# Patient Record
Sex: FEMALE | Race: WHITE
[De-identification: names, ages, dates, MRNs, and addresses within clinical notes are randomized per-mention and may not be internally consistent; named-entity substitution may affect disease eponyms.]

---

## 2021-11-05 NOTE — REPMRS
Patient History

The patient states she has not had a clinical breast exam in over

a year.

Patient had first child at age 38.

Family history of endometrial cancer under age 50 in paternal 

grandmother.

Took hormonal contraceptives for 16 years.

Pfizer vaccines 2/4/21 left arm. 2/25/21 left arm. Booster: 

10/7/21 left arm.

Pt denied pregnancy.

Patient states no breast complaints today. Patient has signed MRS

History Sheet.

 

Digital Woman Screen Mammo: November 5, 2021 - Exam #: 

[id number]

Bilateral CC and MLO view(s) were taken.

 

Technologist: Nurys Dias, RT

FINDINGS: The breast tissue is heterogeneously dense.  This may 

lower the sensitivity of mammography.  Screening.

 

Digital screening (2D) mammography was performed bilaterally. 

Additionally, breast tomosynthesis (3D) mammography was perfomed 

bilaterally in the CC and MLO projections.

 

Today's examination is the initial screening examination.

 

By history, the patient has no complaints of a palpable breast 

abnormality or other significant breast complaints.

 

The breasts are symmetric in size and shape.Dense heterogenous 

fibroglandular elements are seen bilaterally to such a degree 

that the sensitivity of the mammogram in detecting cancer is 

decreased. . There are no masses. There is no internal 

architectural distortion. There are no suspicious microcalcific 

clusters. Skin thickening or nipple retraction is not present.

 

IMPRESSION:

 

BI-RADS Category 2- Benign Findings. There is no evidence of 

malignant alteration of the breasts. Followup examination 

recommended in one year.

The Volpara volumetric breast density category is C, the breasts 

are heterogenously dense which may obscure small masses.

[facility] mamm[facility] 

AMP,an FDA approved computer aided detection system for 

mammography.

The lifetime Tyrer-Cuzick score is   15.2   %  Due to the density

of the breasts or Tyrer Cuzick score of 20% or greater, 

MRI/whole breast screening  ultrasound is warranted.

Negative x-ray reports should not delay surgical consultation if 

a dominant or clinically suspicious mass is present.

 

Not all breast cancers can be identified by mammography. 

Therefore, we recommend that you continue to perform regular 

breast self-examination and physical examination and then 

promptly contact your physician of any concerns or changes.

 

Adenosis and dense breasts may obscure an underlying neoplasm.

 

Assessment: BI-RADS/ACR category 2 mammogram. Benign Findings.

 

Recommendation

Routine screening mammogram of both breasts in 1 year.

 

Electronically Signed By: Stevenson Guo DO 11/05/21 0905

## 2022-05-16 ENCOUNTER — HOSPITAL ENCOUNTER (OUTPATIENT)
Dept: HOSPITAL 53 - M PLAIMG | Age: 42
End: 2022-05-16
Attending: PHYSICIAN ASSISTANT
Payer: COMMERCIAL

## 2022-05-16 DIAGNOSIS — G95.89: ICD-10-CM

## 2022-05-16 DIAGNOSIS — M51.27: Primary | ICD-10-CM

## 2022-05-16 DIAGNOSIS — M54.42: ICD-10-CM

## 2022-06-15 ENCOUNTER — HOSPITAL ENCOUNTER (OUTPATIENT)
Dept: HOSPITAL 53 - M RAD | Age: 42
End: 2022-06-15
Attending: PHYSICIAN ASSISTANT
Payer: COMMERCIAL

## 2022-06-15 DIAGNOSIS — R93.7: Primary | ICD-10-CM

## 2022-06-15 PROCEDURE — 72149 MRI LUMBAR SPINE W/DYE: CPT

## 2022-06-16 ENCOUNTER — HOSPITAL ENCOUNTER (OUTPATIENT)
Dept: HOSPITAL 53 - M LAB REF | Age: 42
End: 2022-06-16
Attending: PHYSICIAN ASSISTANT
Payer: COMMERCIAL

## 2022-06-16 DIAGNOSIS — D72.9: Primary | ICD-10-CM

## 2022-06-16 LAB
BASOPHILS # BLD AUTO: 0.1 10^3/UL (ref 0–0.2)
BASOPHILS NFR BLD AUTO: 0.8 % (ref 0–1)
EOSINOPHIL # BLD AUTO: 0.2 10^3/UL (ref 0–0.5)
EOSINOPHIL NFR BLD AUTO: 2.6 % (ref 0–3)
ERYTHROCYTE [SEDIMENTATION RATE] IN BLOOD BY WESTERGREN METHOD: 7 MM/HR (ref 0–20)
HCT VFR BLD AUTO: 41.2 % (ref 36–47)
HGB BLD-MCNC: 13.9 G/DL (ref 12–15.5)
LYMPHOCYTES # BLD AUTO: 2.6 10^3/UL (ref 1.5–5)
LYMPHOCYTES NFR BLD AUTO: 34.3 % (ref 24–44)
MCH RBC QN AUTO: 30.5 PG (ref 27–33)
MCHC RBC AUTO-ENTMCNC: 33.7 G/DL (ref 32–36.5)
MCV RBC AUTO: 90.5 FL (ref 80–96)
MONOCYTES # BLD AUTO: 0.8 10^3/UL (ref 0–0.8)
MONOCYTES NFR BLD AUTO: 9.9 % (ref 2–8)
NEUTROPHILS # BLD AUTO: 4 10^3/UL (ref 1.5–8.5)
NEUTROPHILS NFR BLD AUTO: 52.1 % (ref 36–66)
PLATELET # BLD AUTO: 182 10^3/UL (ref 150–450)
RBC # BLD AUTO: 4.55 10^6/UL (ref 4–5.4)
WBC # BLD AUTO: 7.6 10^3/UL (ref 4–10)

## 2022-07-25 ENCOUNTER — HOSPITAL ENCOUNTER (OUTPATIENT)
Dept: HOSPITAL 53 - M LAB REF | Age: 42
End: 2022-07-25
Attending: NURSE PRACTITIONER
Payer: COMMERCIAL

## 2022-07-25 DIAGNOSIS — R10.9: ICD-10-CM

## 2022-07-25 DIAGNOSIS — R19.7: Primary | ICD-10-CM

## 2022-09-19 ENCOUNTER — HOSPITAL ENCOUNTER (OUTPATIENT)
Dept: HOSPITAL 53 - M LABSMTC | Age: 42
End: 2022-09-19
Attending: FAMILY MEDICINE

## 2022-09-19 DIAGNOSIS — Z20.822: Primary | ICD-10-CM

## 2022-10-06 ENCOUNTER — HOSPITAL ENCOUNTER (OUTPATIENT)
Dept: HOSPITAL 53 - M SLEEP HO | Age: 42
End: 2022-10-06
Attending: PHYSICIAN ASSISTANT
Payer: COMMERCIAL

## 2022-10-06 DIAGNOSIS — R53.83: Primary | ICD-10-CM

## 2022-11-04 ENCOUNTER — HOSPITAL ENCOUNTER (OUTPATIENT)
Dept: HOSPITAL 53 - M LAB REF | Age: 42
End: 2022-11-04
Attending: PSYCHIATRY & NEUROLOGY
Payer: COMMERCIAL

## 2022-11-04 DIAGNOSIS — M79.10: ICD-10-CM

## 2022-11-04 DIAGNOSIS — R51.9: ICD-10-CM

## 2022-11-04 DIAGNOSIS — E61.1: ICD-10-CM

## 2022-11-04 DIAGNOSIS — E55.9: Primary | ICD-10-CM

## 2022-11-04 LAB
25(OH)D3 SERPL-MCNC: 30.3 NG/ML (ref 30–100)
FERRITIN SERPL-MCNC: 68 NG/ML (ref 8–252)
IRON SATN MFR SERPL: 28.4 % (ref 13.2–45)
IRON SERPL-MCNC: 93 UG/DL (ref 50–170)
RHEUMATOID FACT SERPL-ACNC: < 10 IU/ML (ref ?–15)
TIBC SERPL-MCNC: 328 UG/DL (ref 250–450)

## 2022-11-07 ENCOUNTER — HOSPITAL ENCOUNTER (OUTPATIENT)
Dept: HOSPITAL 53 - M WHC | Age: 42
End: 2022-11-07
Attending: PHYSICIAN ASSISTANT
Payer: COMMERCIAL

## 2022-11-07 DIAGNOSIS — Z12.31: Primary | ICD-10-CM

## 2022-11-07 LAB
DSDNA AB SER-ACNC: 1 IU/ML (ref 0–9)
ENA RNP AB SER-ACNC: 4.5 AI (ref 0–0.9)
ENA SM AB SER-ACNC: <0.2 AI (ref 0–0.9)
ENA SS-A AB SER-ACNC: <0.2 AI (ref 0–0.9)
ENA SS-B AB SER-ACNC: <0.2 AI (ref 0–0.9)

## 2023-02-22 ENCOUNTER — HOSPITAL ENCOUNTER (OUTPATIENT)
Dept: HOSPITAL 53 - M LAB REF | Age: 43
End: 2023-02-22
Attending: NURSE PRACTITIONER
Payer: COMMERCIAL

## 2023-02-22 DIAGNOSIS — J02.9: Primary | ICD-10-CM

## 2023-02-27 ENCOUNTER — HOSPITAL ENCOUNTER (OUTPATIENT)
Dept: HOSPITAL 53 - M LAB REF | Age: 43
End: 2023-02-27
Attending: NURSE PRACTITIONER
Payer: COMMERCIAL

## 2023-02-27 DIAGNOSIS — R59.0: Primary | ICD-10-CM

## 2023-02-27 LAB
ALBUMIN SERPL BCG-MCNC: 4 G/DL (ref 3.2–5.2)
ALP SERPL-CCNC: 87 U/L (ref 46–116)
ALT SERPL W P-5'-P-CCNC: 31 U/L (ref 7–40)
AST SERPL-CCNC: 24 U/L (ref ?–34)
BASOPHILS # BLD AUTO: 0.1 10^3/UL (ref 0–0.2)
BASOPHILS NFR BLD AUTO: 0.8 % (ref 0–1)
BILIRUB SERPL-MCNC: 0.8 MG/DL (ref 0.3–1.2)
BUN SERPL-MCNC: 9 MG/DL (ref 9–23)
CALCIUM SERPL-MCNC: 9 MG/DL (ref 8.5–10.1)
CHLORIDE SERPL-SCNC: 105 MMOL/L (ref 98–107)
CO2 SERPL-SCNC: 24 MMOL/L (ref 20–31)
CREAT SERPL-MCNC: 0.7 MG/DL (ref 0.55–1.3)
CRP SERPL-MCNC: 1.1 MG/DL (ref ?–1)
EOSINOPHIL # BLD AUTO: 0.1 10^3/UL (ref 0–0.5)
EOSINOPHIL NFR BLD AUTO: 1.4 % (ref 0–3)
ERYTHROCYTE [SEDIMENTATION RATE] IN BLOOD BY WESTERGREN METHOD: 22 MM/HR (ref 0–20)
GFR SERPL CREATININE-BSD FRML MDRD: > 60 ML/MIN/{1.73_M2} (ref 58–?)
GLUCOSE SERPL-MCNC: 89 MG/DL (ref 60–100)
HCT VFR BLD AUTO: 39.4 % (ref 36–47)
HGB BLD-MCNC: 13.4 G/DL (ref 12–15.5)
LYMPHOCYTES # BLD AUTO: 2.5 10^3/UL (ref 1.5–5)
LYMPHOCYTES NFR BLD AUTO: 33.2 % (ref 24–44)
MCH RBC QN AUTO: 30.2 PG (ref 27–33)
MCHC RBC AUTO-ENTMCNC: 34 G/DL (ref 32–36.5)
MCV RBC AUTO: 88.7 FL (ref 80–96)
MONOCYTES # BLD AUTO: 0.6 10^3/UL (ref 0–0.8)
MONOCYTES NFR BLD AUTO: 8 % (ref 2–8)
NEUTROPHILS # BLD AUTO: 4.3 10^3/UL (ref 1.5–8.5)
NEUTROPHILS NFR BLD AUTO: 56.2 % (ref 36–66)
PLATELET # BLD AUTO: 222 10^3/UL (ref 150–450)
POTASSIUM SERPL-SCNC: 3.5 MMOL/L (ref 3.5–5.1)
PROT SERPL-MCNC: 6.8 G/DL (ref 5.7–8.2)
RBC # BLD AUTO: 4.44 10^6/UL (ref 4–5.4)
SODIUM SERPL-SCNC: 140 MMOL/L (ref 136–145)
T4 FREE SERPL-MCNC: 0.98 NG/DL (ref 0.89–1.76)
TSH SERPL DL<=0.005 MIU/L-ACNC: 1.14 UIU/ML (ref 0.55–4.78)
WBC # BLD AUTO: 7.7 10^3/UL (ref 4–10)

## 2023-02-28 ENCOUNTER — HOSPITAL ENCOUNTER (OUTPATIENT)
Dept: HOSPITAL 53 - M RAD | Age: 43
End: 2023-02-28
Attending: NURSE PRACTITIONER
Payer: COMMERCIAL

## 2023-02-28 DIAGNOSIS — R59.0: Primary | ICD-10-CM

## 2023-06-19 ENCOUNTER — HOSPITAL ENCOUNTER (OUTPATIENT)
Dept: HOSPITAL 53 - M EMP | Age: 43
End: 2023-06-19
Attending: FAMILY MEDICINE

## 2023-06-19 DIAGNOSIS — Z20.822: Primary | ICD-10-CM
